# Patient Record
Sex: MALE | Race: WHITE | URBAN - METROPOLITAN AREA
[De-identification: names, ages, dates, MRNs, and addresses within clinical notes are randomized per-mention and may not be internally consistent; named-entity substitution may affect disease eponyms.]

---

## 2018-08-27 ENCOUNTER — OFFICE VISIT (OUTPATIENT)
Dept: FAMILY MEDICINE | Facility: CLINIC | Age: 63
End: 2018-08-27
Payer: COMMERCIAL

## 2018-08-27 VITALS
TEMPERATURE: 97.3 F | BODY MASS INDEX: 28.7 KG/M2 | WEIGHT: 205 LBS | HEIGHT: 71 IN | DIASTOLIC BLOOD PRESSURE: 78 MMHG | SYSTOLIC BLOOD PRESSURE: 110 MMHG | RESPIRATION RATE: 16 BRPM | HEART RATE: 65 BPM

## 2018-08-27 DIAGNOSIS — J01.01 ACUTE RECURRENT MAXILLARY SINUSITIS: Primary | ICD-10-CM

## 2018-08-27 PROCEDURE — 99203 OFFICE O/P NEW LOW 30 MIN: CPT | Performed by: PHYSICIAN ASSISTANT

## 2018-08-27 RX ORDER — FLECAINIDE ACETATE 100 MG/1
100 TABLET ORAL DAILY
COMMUNITY
Start: 2018-07-23

## 2018-08-27 ASSESSMENT — ENCOUNTER SYMPTOMS
CARDIOVASCULAR NEGATIVE: 1
PSYCHIATRIC NEGATIVE: 1
EYES NEGATIVE: 1
ENDOCRINE NEGATIVE: 1
MUSCULOSKELETAL NEGATIVE: 1
GASTROINTESTINAL NEGATIVE: 1
NEUROLOGICAL NEGATIVE: 1

## 2018-08-27 NOTE — MR AVS SNAPSHOT
After Visit Summary   8/27/2018    Phong Su    MRN: [id number]           Patient Information     Date Of Birth          1955        Visit Information        Provider Department      8/27/2018 2:10 PM Yuko Ureña PA-C [facility]        Today's Diagnoses     Acute recurrent maxillary sinusitis    -  1    Screen for colon cancer        Need for hepatitis C screening test        Screening for HIV (human immunodeficiency virus)        Need for prophylactic vaccination with tetanus-diphtheria (TD)          Care Instructions    Treatments for sinus infection:  1. Flonase - use one spray in each nostril once a day  2. Sinus Rinse or Neti Pot - these can be purchased at any pharmacy.  Use 1-2 times a day to relieve sinus congestion.     Additional treatment options for symptom relieve:  3. Take ibuprofen and acetaminophen (Tylenol) as needed for pain and/or fever.   4. Mucinex (guaifenisen) may help to thin the nasal mucus  5. Humidifiers or diffusers.  There is some evidence to support using essential oils such as eucalyptus, peppermint, citrus blends, or oregano in a diffuser for nasal congestion.  I do not recommend drinking the oils or placing them directly on the skin, since the concentrations of the oils are not regulated and vary between brands.     Most sinus infections are caused by a virus.    If the symptoms do not improve in a week - call for an antibiotic.             Follow-ups after your visit        Additional Services     OTOLARYNGOLOGY REFERRAL       Your provider has referred you to: [facility]: [place] [facility] [phone]   [email or web address]  [facility]: [facility] [place] [phone]   [email or web address]    Please be aware that coverage of these services is subject to the terms and limitations of your health insurance plan.  Call member services at your health plan with any benefit or  "coverage questions.      Please bring the following with you to your appointment:    (1) Any X-Rays, CTs or MRIs which have been performed.  Contact the facility where they were done to arrange for  prior to your scheduled appointment.   (2) List of current medications  (3) This referral request   (4) Any documents/labs given to you for this referral                  Who to contact     If you have questions or need follow up information about today's clinic visit or your schedule please contact [facility] directly at [phone].  Normal or non-critical lab and imaging results will be communicated to you by [employer], letter or phone within 4 business days after the clinic has received the results. If you do not hear from us within 7 days, please contact the clinic through [employer] or phone. If you have a critical or abnormal lab result, we will notify you by phone as soon as possible.  Submit refill requests through [employer] or call your pharmacy and they will forward the refill request to us. Please allow 3 business days for your refill to be completed.          Additional Information About Your Visit        [employer] Information     [employer] lets you send messages to your doctor, view your test results, renew your prescriptions, schedule appointments and more. To sign up, go to [email or web address] . Click on \"Log in\" on the left side of the screen, which will take you to the Welcome page. Then click on \"Sign up Now\" on the right side of the page.     You will be asked to enter the access code listed below, as well as some personal information. Please follow the directions to create your username and password.     Your access code is: [email or web address]  Expires: 2018  1:44 PM     Your access code will  in 90 days. If you need help or a new code, please call your [facility] or [phone].        Care EveryWhere ID     This is your Care EveryWhere ID. This could be " "used by other organizations to access your [place] medical records  [id number]        Your Vitals Were     Pulse Temperature Respirations Height BMI (Body Mass Index)       65 97.3  F (36.3  C) (Tympanic) 16 5' 10.5\" (1.791 m) 29 kg/m2        Blood Pressure from Last 3 Encounters:   08/27/18 110/78    Weight from Last 3 Encounters:   08/27/18 205 lb (93 kg)              We Performed the Following     OTOLARYNGOLOGY REFERRAL        Primary Care Provider    None Specified       No primary provider on file.        Equal Access to Services     [facility]: Hadii aad ku hadasho Soomaali, waaxda luqadaha, qaybta kaalmada adeegyada, matias noriega . So [facility] [phone].    ATENCIÓN: Si habla español, tiene a lewis disposición servicios gratuitos de asistencia lingüística. Llame al [phone].    We comply with applicable federal civil rights laws and [place] laws. We do not discriminate on the basis of race, color, national origin, age, disability, sex, sexual orientation, or gender identity.            Thank you!     Thank you for choosing [facility]  for your care. Our goal is always to provide you with excellent care. Hearing back from our patients is one way we can continue to improve our services. Please take a few minutes to complete the written survey that you may receive in the mail after your visit with us. Thank you!             Your Updated Medication List - Protect others around you: Learn how to safely use, store and throw away your medicines at www.disposemymeds.org.          This list is accurate as of 8/27/18  2:21 PM.  Always use your most recent med list.                   Brand Name Dispense Instructions for use Diagnosis    flecainide 100 MG tablet    TAMBOCOR     Take 100 mg by mouth daily          "

## 2018-08-27 NOTE — PATIENT INSTRUCTIONS
Treatments for sinus infection:  1. Flonase - use one spray in each nostril once a day  2. Sinus Rinse or Neti Pot - these can be purchased at any pharmacy.  Use 1-2 times a day to relieve sinus congestion.     Additional treatment options for symptom relieve:  3. Take ibuprofen and acetaminophen (Tylenol) as needed for pain and/or fever.   4. Mucinex (guaifenisen) may help to thin the nasal mucus  5. Humidifiers or diffusers.  There is some evidence to support using essential oils such as eucalyptus, peppermint, citrus blends, or oregano in a diffuser for nasal congestion.  I do not recommend drinking the oils or placing them directly on the skin, since the concentrations of the oils are not regulated and vary between brands.     Most sinus infections are caused by a virus.    If the symptoms do not improve in a week - call for an antibiotic.

## 2018-08-27 NOTE — PROGRESS NOTES
SUBJECTIVE:   Phong Su is a 63 year old male who presents to clinic today for the following health issues:      Acute Illness   Acute illness concerns: sinus pain and pressure, fatigue  Onset: 3 days    Fever: no    Chills/Sweats: no    Headache (location?): YES    Sinus Pressure:YES- tender and facial pain    Conjunctivitis:  no    Ear Pain: no    Rhinorrhea: YES    Congestion: YES    Sore Throat: no     Cough: no    Wheeze: no    Decreased Appetite: no    Nausea: YES    Vomiting: no    Diarrhea:  no    Dysuria/Freq.: no    Fatigue/Achiness: YES    Sick/Strep Exposure: no     Therapies Tried and outcome: netipot, Saline nasal spray     Treatments provide some relief  He has a lot of pressure  He has sinus infections every few months for the past five years - worse when travelling for work  Allergy symptoms are improved right now  In the past antibiotics do not seem to change it    Patient recently moved here from [facility] - not sure if he is up to date on preventive care at this point    Problem list and histories reviewed & adjusted, as indicated.  Additional history: as documented    There is no problem list on file for this patient.    No past surgical history on file.    Social History   Substance Use Topics     Smoking status: Never Smoker     Smokeless tobacco: Never Used     Alcohol use Yes      Comment: 7-8 beers a week     Family History   Problem Relation Age of Onset     Alzheimer Disease Mother      Coronary Artery Disease Father 85     angioplasty         Current Outpatient Prescriptions   Medication Sig Dispense Refill     flecainide (TAMBOCOR) 100 MG tablet Take 100 mg by mouth daily       Allergies   Allergen Reactions     Pollen Extract        Reviewed and updated as needed this visit by clinical staff  Tobacco  Allergies  Meds       Reviewed and updated as needed this visit by Provider         Review of Systems   Constitutional:        As in HPI   HENT:        As in HPI   Eyes:  "Negative.    Respiratory:        As in HPI   Cardiovascular: Negative.    Gastrointestinal: Negative.    Endocrine: Negative.    Genitourinary: Negative.    Musculoskeletal: Negative.    Skin: Negative.    Neurological: Negative.    Psychiatric/Behavioral: Negative.        OBJECTIVE:     /78 (Cuff Size: Adult Large)  Pulse 65  Temp 97.3  F (36.3  C) (Tympanic)  Resp 16  Ht 5' 10.5\" (1.791 m)  Wt 205 lb (93 kg)  BMI 29 kg/m2  Body mass index is 29 kg/(m^2).    Physical Exam   Constitutional: He is oriented to person, place, and time. He appears well-developed and well-nourished.   HENT:   Head: Normocephalic and atraumatic.   Right Ear: Tympanic membrane and ear canal normal.   Left Ear: Tympanic membrane and ear canal normal.   Nose: Mucosal edema and rhinorrhea present. Right sinus exhibits maxillary sinus tenderness. Right sinus exhibits no frontal sinus tenderness. Left sinus exhibits maxillary sinus tenderness. Left sinus exhibits no frontal sinus tenderness.   Mouth/Throat: Uvula is midline and mucous membranes are normal. No oropharyngeal exudate, posterior oropharyngeal edema or posterior oropharyngeal erythema.   Eyes: Conjunctivae and EOM are normal. Pupils are equal, round, and reactive to light.   Neck: Normal range of motion.   Cardiovascular: Normal rate, regular rhythm and normal heart sounds.    Pulmonary/Chest: Effort normal and breath sounds normal.   Lymphadenopathy:     He has no cervical adenopathy.   Neurological: He is alert and oriented to person, place, and time.   Skin: Skin is warm and dry.   Psychiatric: He has a normal mood and affect. Judgment normal.       No results found for this or any previous visit (from the past 24 hour(s)).    ASSESSMENT/PLAN:       ICD-10-CM    1. Acute recurrent maxillary sinusitis J01.01 OTOLARYNGOLOGY REFERRAL        Patient Instructions   Treatments for sinus infection:  1. Flonase - use one spray in each nostril once a day  2. Sinus Rinse or " Neti Pot - these can be purchased at any pharmacy.  Use 1-2 times a day to relieve sinus congestion.     Additional treatment options for symptom relieve:  3. Take ibuprofen and acetaminophen (Tylenol) as needed for pain and/or fever.   4. Mucinex (guaifenisen) may help to thin the nasal mucus  5. Humidifiers or diffusers.  There is some evidence to support using essential oils such as eucalyptus, peppermint, citrus blends, or oregano in a diffuser for nasal congestion.  I do not recommend drinking the oils or placing them directly on the skin, since the concentrations of the oils are not regulated and vary between brands.     Most sinus infections are caused by a virus.    If the symptoms do not improve in a week - call for an antibiotic.     If he calls back for an antibiotic on or after 9/3/2018, please fill   AMOXICILLIN-POT CLAVULANATE 875-125 MG PO TABS  Take one tab BID for 10 days.  # 20.  No refills.     Anupam Ureña PA-C  [facility]

## 2018-12-21 ENCOUNTER — HOSPITAL ENCOUNTER (OUTPATIENT)
Facility: CLINIC | Age: 63
Discharge: HOME | End: 2018-12-21
Attending: COLON & RECTAL SURGERY | Admitting: COLON & RECTAL SURGERY
Payer: COMMERCIAL

## 2018-12-21 VITALS
WEIGHT: 195 LBS | BODY MASS INDEX: 27.92 KG/M2 | OXYGEN SATURATION: 95 % | HEIGHT: 70 IN | SYSTOLIC BLOOD PRESSURE: 139 MMHG | DIASTOLIC BLOOD PRESSURE: 90 MMHG | RESPIRATION RATE: 9 BRPM

## 2018-12-21 LAB — COLONOSCOPY: NORMAL

## 2018-12-21 PROCEDURE — G0500 MOD SEDAT ENDO SERVICE >5YRS: HCPCS | Performed by: COLON & RECTAL SURGERY

## 2018-12-21 PROCEDURE — 45385 COLONOSCOPY W/LESION REMOVAL: CPT | Mod: PT | Performed by: COLON & RECTAL SURGERY

## 2018-12-21 PROCEDURE — 25000128 H RX IP 250 OP 636: Performed by: COLON & RECTAL SURGERY

## 2018-12-21 PROCEDURE — 88305 TISSUE EXAM BY PATHOLOGIST: CPT | Mod: 26 | Performed by: COLON & RECTAL SURGERY

## 2018-12-21 PROCEDURE — 27210582 ZZH DEVICE CLIP RESOLUTION, EACH: Performed by: COLON & RECTAL SURGERY

## 2018-12-21 PROCEDURE — 88305 TISSUE EXAM BY PATHOLOGIST: CPT | Performed by: COLON & RECTAL SURGERY

## 2018-12-21 PROCEDURE — 45380 COLONOSCOPY AND BIOPSY: CPT | Performed by: COLON & RECTAL SURGERY

## 2018-12-21 RX ORDER — ONDANSETRON 2 MG/ML
4 INJECTION INTRAMUSCULAR; INTRAVENOUS EVERY 6 HOURS PRN
Status: DISCONTINUED | OUTPATIENT
Start: 2018-12-21 | End: 2018-12-21 | Stop reason: HOSPADM

## 2018-12-21 RX ORDER — ATORVASTATIN CALCIUM 10 MG/1
10 TABLET, FILM COATED ORAL DAILY
COMMUNITY

## 2018-12-21 RX ORDER — FENTANYL CITRATE 50 UG/ML
INJECTION, SOLUTION INTRAMUSCULAR; INTRAVENOUS PRN
Status: DISCONTINUED | OUTPATIENT
Start: 2018-12-21 | End: 2018-12-21 | Stop reason: HOSPADM

## 2018-12-21 RX ORDER — ONDANSETRON 2 MG/ML
4 INJECTION INTRAMUSCULAR; INTRAVENOUS
Status: DISCONTINUED | OUTPATIENT
Start: 2018-12-21 | End: 2018-12-21 | Stop reason: HOSPADM

## 2018-12-21 RX ORDER — ONDANSETRON 4 MG/1
4 TABLET, ORALLY DISINTEGRATING ORAL EVERY 6 HOURS PRN
Status: DISCONTINUED | OUTPATIENT
Start: 2018-12-21 | End: 2018-12-21 | Stop reason: HOSPADM

## 2018-12-21 RX ORDER — FLUMAZENIL 0.1 MG/ML
0.2 INJECTION, SOLUTION INTRAVENOUS
Status: DISCONTINUED | OUTPATIENT
Start: 2018-12-21 | End: 2018-12-21 | Stop reason: HOSPADM

## 2018-12-21 RX ORDER — NALOXONE HYDROCHLORIDE 0.4 MG/ML
.1-.4 INJECTION, SOLUTION INTRAMUSCULAR; INTRAVENOUS; SUBCUTANEOUS
Status: DISCONTINUED | OUTPATIENT
Start: 2018-12-21 | End: 2018-12-21 | Stop reason: HOSPADM

## 2018-12-21 RX ORDER — LIDOCAINE 40 MG/G
CREAM TOPICAL
Status: DISCONTINUED | OUTPATIENT
Start: 2018-12-21 | End: 2018-12-21 | Stop reason: HOSPADM

## 2018-12-21 ASSESSMENT — MIFFLIN-ST. JEOR: SCORE: 1695.76

## 2018-12-21 NOTE — H&P
"Pre-Endoscopy History and Physical     Phong Su MRN# [id number]   YOB: 1955 Age: 63 year old     Date of Procedure: (Not on file)  Primary care provider: Jana Hensley  Type of Endoscopy: Colonoscopy  Reason for Procedure: Screening  Type of Anesthesia Anticipated: Moderate Sedation    HPI:    Phong is a 63 year old male who will be undergoing the above procedure.      A history and physical has been performed. The patient's medications and allergies have been reviewed. The risks and benefits of the procedure and the sedation options and risks were discussed with the patient.  All questions were answered and informed consent was obtained.      He denies a personal or family history of anesthesia complications or bleeding disorders.     Allergies   Allergen Reactions     Pollen Extract           No current facility-administered medications on file prior to encounter.   Current Outpatient Medications on File Prior to Encounter:  atorvastatin (LIPITOR) 10 MG tablet Take 10 mg by mouth daily   flecainide (TAMBOCOR) 100 MG tablet Take 100 mg by mouth daily       There is no problem list on file for this patient.       No past medical history on file.     No past surgical history on file.    Social History     Tobacco Use     Smoking status: Never Smoker     Smokeless tobacco: Never Used   Substance Use Topics     Alcohol use: Yes     Comment: 7-8 beers a week       Family History   Problem Relation Age of Onset     Alzheimer Disease Mother      Coronary Artery Disease Father 85        angioplasty       REVIEW OF SYSTEMS:     5 point ROS negative except as noted above in HPI, including Gen., Resp., CV, GI &  system review.      PHYSICAL EXAM:   There were no vitals taken for this visit. Estimated body mass index is 29 kg/m  as calculated from the following:    Height as of 8/27/18: 1.791 m (5' 10.5\").    Weight as of 8/27/18: 93 kg (205 lb).   GENERAL APPEARANCE: healthy and alert  MENTAL STATUS: " alert  RESP: lungs clear to auscultation - no rales, rhonchi or wheezes  CV: regular rates and rhythm and normal S1 S2, no S3 or S4      IMPRESSION   ASA Class 2 - Mild systemic disease        PLAN:     Plan for colonoscopy. We discussed the risks, benefits and alternatives and the patient wished to proceed.    The above has been forwarded to the consulting provider.      Austin Barrientos MD  [facility]  Phone: [phone]  December 21, 2018

## 2018-12-24 LAB — COPATH REPORT: NORMAL

## 2020-03-11 ENCOUNTER — HEALTH MAINTENANCE LETTER (OUTPATIENT)
Age: 65
End: 2020-03-11

## 2021-01-03 ENCOUNTER — HEALTH MAINTENANCE LETTER (OUTPATIENT)
Age: 66
End: 2021-01-03

## 2021-04-25 ENCOUNTER — HEALTH MAINTENANCE LETTER (OUTPATIENT)
Age: 66
End: 2021-04-25

## 2021-10-10 ENCOUNTER — HEALTH MAINTENANCE LETTER (OUTPATIENT)
Age: 66
End: 2021-10-10

## 2022-03-26 ENCOUNTER — HEALTH MAINTENANCE LETTER (OUTPATIENT)
Age: 67
End: 2022-03-26

## 2022-09-18 ENCOUNTER — HEALTH MAINTENANCE LETTER (OUTPATIENT)
Age: 67
End: 2022-09-18

## 2023-05-07 ENCOUNTER — HEALTH MAINTENANCE LETTER (OUTPATIENT)
Age: 68
End: 2023-05-07

## (undated) RX ORDER — FENTANYL CITRATE 50 UG/ML
INJECTION, SOLUTION INTRAMUSCULAR; INTRAVENOUS
Status: DISPENSED
Start: 2018-12-21